# Patient Record
Sex: MALE | URBAN - METROPOLITAN AREA
[De-identification: names, ages, dates, MRNs, and addresses within clinical notes are randomized per-mention and may not be internally consistent; named-entity substitution may affect disease eponyms.]

---

## 2024-10-17 ENCOUNTER — TELEPHONE (OUTPATIENT)
Dept: NEUROLOGY | Facility: CLINIC | Age: 69
End: 2024-10-17
Payer: MEDICARE

## 2024-10-17 NOTE — TELEPHONE ENCOUNTER
Caller: Daquan Nagy    Relationship: Self    Best call back number: [phone]    What is the best time to reach you:     Who are you requesting to speak with (clinical staff, provider,  specific staff member):       Do you know the name of the person who called:     What was the call regarding:   PT IS REQUESTING APPT REMINDER THAT CONTAINS OFFICE ADDRESS BE MAILED TO HIS HOME ADDRESS:  [address] [place] [id number]

## 2024-12-13 ENCOUNTER — OFFICE VISIT (OUTPATIENT)
Dept: NEUROLOGY | Facility: CLINIC | Age: 69
End: 2024-12-13
Payer: MEDICARE

## 2024-12-13 VITALS
WEIGHT: 168 LBS | HEIGHT: 72 IN | DIASTOLIC BLOOD PRESSURE: 86 MMHG | OXYGEN SATURATION: 75 % | SYSTOLIC BLOOD PRESSURE: 142 MMHG | HEART RATE: 80 BPM | BODY MASS INDEX: 22.75 KG/M2

## 2024-12-13 DIAGNOSIS — G62.9 POLYNEUROPATHY: Primary | ICD-10-CM

## 2024-12-13 DIAGNOSIS — R25.2 JERKING MOVEMENTS OF EXTREMITIES: ICD-10-CM

## 2024-12-13 PROBLEM — R13.19 ESOPHAGEAL DYSPHAGIA: Status: ACTIVE | Noted: 2023-11-10

## 2024-12-13 PROBLEM — Z98.890 HISTORY OF CARDIAC CATHETERIZATION: Status: ACTIVE | Noted: 2024-12-13

## 2024-12-13 PROBLEM — Z85.038 PERSONAL HISTORY OF COLON CANCER: Status: ACTIVE | Noted: 2021-02-23

## 2024-12-13 PROBLEM — K22.0 ACHALASIA OF DIGESTIVE TRACT: Status: ACTIVE | Noted: 2023-11-10

## 2024-12-13 PROBLEM — I73.9 PERIPHERAL VASCULAR DISEASE: Status: ACTIVE | Noted: 2021-08-12

## 2024-12-13 PROBLEM — K22.2 BENIGN ESOPHAGEAL STRICTURE: Status: ACTIVE | Noted: 2024-01-02

## 2024-12-13 PROBLEM — E11.43 GASTROPARESIS DUE TO DM: Chronic | Status: ACTIVE | Noted: 2024-10-10

## 2024-12-13 PROBLEM — I10 BENIGN ESSENTIAL HYPERTENSION: Status: ACTIVE | Noted: 2021-02-23

## 2024-12-13 PROBLEM — K21.9 GASTROESOPHAGEAL REFLUX DISEASE WITHOUT ESOPHAGITIS: Status: ACTIVE | Noted: 2021-02-23

## 2024-12-13 PROBLEM — I11.9 HYPERTENSIVE HEART DISEASE: Status: ACTIVE | Noted: 2021-08-12

## 2024-12-13 PROBLEM — C18.9 MALIGNANT TUMOR OF COLON: Status: ACTIVE | Noted: 2024-12-13

## 2024-12-13 PROBLEM — I25.10 CORONARY ARTERIOSCLEROSIS: Status: ACTIVE | Noted: 2021-02-23

## 2024-12-13 PROBLEM — K59.00 CONSTIPATION: Status: ACTIVE | Noted: 2023-07-06

## 2024-12-13 PROBLEM — E11.65 HYPERGLYCEMIA DUE TO TYPE 2 DIABETES MELLITUS: Status: ACTIVE | Noted: 2021-02-23

## 2024-12-13 PROBLEM — K31.84 GASTROPARESIS DUE TO DM: Chronic | Status: ACTIVE | Noted: 2024-10-10

## 2024-12-13 PROBLEM — E55.9 VITAMIN D DEFICIENCY: Status: ACTIVE | Noted: 2024-12-13

## 2024-12-13 PROBLEM — E11.40 DIABETIC NEUROPATHY: Status: ACTIVE | Noted: 2021-02-23

## 2024-12-13 RX ORDER — LANCETS
EACH MISCELLANEOUS
COMMUNITY

## 2024-12-13 RX ORDER — FLUTICASONE PROPIONATE 50 MCG
SPRAY, SUSPENSION (ML) NASAL
COMMUNITY
End: 2024-12-13

## 2024-12-13 RX ORDER — GABAPENTIN 800 MG/1
800 TABLET ORAL 3 TIMES DAILY
COMMUNITY

## 2024-12-13 RX ORDER — GLIMEPIRIDE 2 MG/1
TABLET ORAL
COMMUNITY

## 2024-12-13 RX ORDER — ASPIRIN 81 MG/1
1 TABLET ORAL DAILY
COMMUNITY
End: 2024-12-13

## 2024-12-13 RX ORDER — DULOXETIN HYDROCHLORIDE 20 MG/1
20 CAPSULE, DELAYED RELEASE ORAL DAILY
Qty: 30 CAPSULE | Refills: 2 | Status: SHIPPED | OUTPATIENT
Start: 2024-12-13 | End: 2025-12-13

## 2024-12-13 RX ORDER — DAPAGLIFLOZIN 10 MG/1
TABLET, FILM COATED ORAL
COMMUNITY

## 2024-12-13 RX ORDER — ERGOCALCIFEROL 1.25 MG/1
CAPSULE, LIQUID FILLED ORAL
COMMUNITY
Start: 2024-09-19

## 2024-12-13 RX ORDER — SUCRALFATE ORAL 1 G/10ML
SUSPENSION ORAL
COMMUNITY
Start: 2024-10-11 | End: 2024-12-13

## 2024-12-13 RX ORDER — BLOOD-GLUCOSE METER
EACH MISCELLANEOUS
COMMUNITY

## 2024-12-13 RX ORDER — ROPINIROLE 0.5 MG/1
TABLET, FILM COATED ORAL
COMMUNITY
End: 2024-12-13

## 2024-12-13 RX ORDER — FAMOTIDINE 20 MG/1
TABLET, FILM COATED ORAL
COMMUNITY
End: 2024-12-13

## 2024-12-13 RX ORDER — LISINOPRIL 10 MG/1
TABLET ORAL
COMMUNITY

## 2024-12-13 RX ORDER — PANTOPRAZOLE SODIUM 40 MG/1
40 TABLET, DELAYED RELEASE ORAL
COMMUNITY
Start: 2024-09-11

## 2024-12-13 RX ORDER — SITAGLIPTIN 100 MG/1
TABLET, FILM COATED ORAL
COMMUNITY

## 2024-12-13 RX ORDER — METOCLOPRAMIDE 5 MG/1
5 TABLET ORAL 3 TIMES DAILY
COMMUNITY
Start: 2024-09-11 | End: 2024-12-13

## 2024-12-13 RX ORDER — ATORVASTATIN CALCIUM 20 MG/1
TABLET, FILM COATED ORAL
COMMUNITY

## 2024-12-13 RX ORDER — TRAMADOL HYDROCHLORIDE 50 MG/1
TABLET ORAL
COMMUNITY

## 2024-12-13 RX ORDER — LINACLOTIDE 290 UG/1
CAPSULE, GELATIN COATED ORAL
COMMUNITY

## 2024-12-13 RX ORDER — CYCLOBENZAPRINE HCL 10 MG
TABLET ORAL
COMMUNITY

## 2024-12-13 RX ORDER — METOPROLOL SUCCINATE 25 MG/1
TABLET, EXTENDED RELEASE ORAL
COMMUNITY

## 2024-12-13 RX ORDER — CLOPIDOGREL BISULFATE 75 MG/1
TABLET ORAL
COMMUNITY

## 2024-12-13 RX ORDER — NITROGLYCERIN 0.4 MG/1
TABLET SUBLINGUAL
COMMUNITY

## 2024-12-13 NOTE — PROGRESS NOTES
Neuro Office Visit      Encounter Date: 2024   Patient Name: Daquan Nagy  : 1955   MRN: [id number]   PCP:  Rosario Patel APRN     Chief Complaint:    Chief Complaint   Patient presents with   • myoclonus       History of Present Illness: Daquan Nagy is a 69 y.o. male who is here today in Neurology for  myoclonus    PMH of achalasia of digestive tract, benign esophageal stricture, benign essential hypertension, constipation, coronary arteriosclerosis, diabetes mellitus, diabetic neuropathy, esophageal dysphagia, gastroesophageal reflux disease without esophagitis, hyperglycemia due to type 2 diabetes, hypertensive heart disease, left knee pain, ligament tumor of colon, mixed hyperlipidemia, neuropathy, peripheral vascular disease, personal history of colon cancer, thoracic and lumbosacral neuritis, vitamin D deficiency    Primary care appointment on 2024 he reported uncontrollable jerking of his extremities PCP did prescribe ropinirole.    In clinic today Daquan reports that his symptoms started 2 years ago of jerking, involuntary whole body jerking, at that time he was taking Reglan frequently. He notes that the frequency improved in the last 5 months, he stopped Reglan about 5 months ago. He was taking Reglan for constipation. He tried Requip but this was stopped by GI doctor in May of this year. Spouse notes jerking prior to falling asleep nightly. Daytime jerking has improved however he did have some yesterday.  He has been on Gabapentin 800 mg TID for several years. He also takes Tramadol 50 mg BID and Cyclobenzaprine 10 mg TID for leg pain. He reports that he can go up to a week without any jerking or it can happen daily but overall frequency is much improved since stopped Reglan.    He is also concerned about neuropathy, was noted on EMG 2014 showed sensorimotor neuropathy, mild.  No evidence for radiculopathy or plexopathy is seen on the left leg.  started has been  more notable over the last few years. He reports that his legs hurt, worse at night, will sometimes feel like a bee stinging his legs. He notes numbness/pain in bilateral hands. He reports that most recent A1c was 7.7. Numbness has progressed in his legs up to his knees, worst in his feet. He reports that his feet feel cold. He takes Gabapentin 800 mg TID with mild relief of his symptoms. No sleepiness from Gabapentin. Spouse describes concern for circulation, not currently seeing vascular, feet are dark when he goes to bed and white in the morning. He has had a few falls. He reports that his balance is poor at times. No significant neck or back pain.     Subjective      Review of Systems   Constitutional: Negative.    HENT: Negative.     Eyes: Negative.    Respiratory:  Negative for shortness of breath.    Cardiovascular: Negative.    Musculoskeletal:  Negative for back pain and neck pain.   Skin:  Positive for color change.   Neurological:  Positive for numbness.        Involuntary muscle jerking   Psychiatric/Behavioral:  Positive for sleep disturbance.           Past Medical History: History reviewed. No pertinent past medical history.    Past Surgical History: History reviewed. No pertinent surgical history.    Family History: History reviewed. No pertinent family history.    Social History:   Social History     Socioeconomic History   • Marital status:    Tobacco Use   • Smoking status: Former     Types: Cigarettes     Passive exposure: Past   Vaping Use   • Vaping status: Never Used       Medications:     Current Outpatient Medications:   •  Accu-Chek Softclix Lancets lancets, USE AS DIRECTED ONCE EVERY DAY, Disp: , Rfl:   •  atorvastatin (LIPITOR) 20 MG tablet, TAKE ONE (1) TABLET EVERY DAY BY ORAL ROUTE FOR 90 DAYS., Disp: , Rfl:   •  Blood Glucose Monitoring Suppl (Accu-Chek Guide Me) w/Device kit, USE AS DIRECTED PER DOCTORS INSTRUCTION, Disp: , Rfl:   •  clopidogrel (PLAVIX) 75 MG tablet, take one  (1) tablet by mouth every day, Disp: , Rfl:   •  Cyanocobalamin 1000 MCG/ML kit, Inject 1 mL every month by subcutaneous route., Disp: , Rfl:   •  cyclobenzaprine (FLEXERIL) 10 MG tablet, TAKE ONE (1) TABLET THREE (3) TIMES A DAY BY ORAL ROUTE AS NEEDED FOR 30 DAYS., Disp: , Rfl:   •  Farxiga 10 MG tablet, TAKE ONE (1) TABLET BY MOUTH ONCE DAILY IN THE MORNING, Disp: , Rfl:   •  gabapentin (NEURONTIN) 800 MG tablet, Take 1 tablet by mouth 3 (Three) Times a Day., Disp: , Rfl:   •  glimepiride (AMARYL) 2 MG tablet, TAKE ONE (1) TABLET BY MOUTH EVERY DAY AT DINNER, Disp: , Rfl:   •  glucose blood test strip, USE ONE (1) STRIP DAILY TO TEST BLOOD SUGAR, Disp: , Rfl:   •  Januvia 100 MG tablet, TAKE ONE (1) TABLET EVERY DAY BY ORAL ROUTE AS DIRECTED FOR 90 DAYS., Disp: , Rfl:   •  Linzess 290 MCG capsule capsule, TAKE ONE (1) CAPSULE EVERY DAY BY ORAL ROUTE FOR 90 DAYS., Disp: , Rfl:   •  lisinopril (PRINIVIL,ZESTRIL) 10 MG tablet, take one (1) tablet by mouth every day, Disp: , Rfl:   •  metFORMIN (GLUCOPHAGE) 1000 MG tablet, Take 1 tablet by mouth 2 (Two) Times a Day., Disp: , Rfl:   •  metoprolol succinate XL (TOPROL-XL) 25 MG 24 hr tablet, take one (1) tablet by mouth every day, Disp: , Rfl:   •  nitroglycerin (NITROSTAT) 0.4 MG SL tablet, DISSOLVE ONE (1) TABLET UNDER TONGUE AS NEEDED, Disp: , Rfl:   •  NON FORMULARY, BD ULTRA-FINE HUNTER PEN NEEDLE, Disp: , Rfl:   •  pantoprazole (PROTONIX) 40 MG EC tablet, Take 1 tablet by mouth., Disp: , Rfl:   •  traMADol (ULTRAM) 50 MG tablet, TAKE ONE (1) TABLET BY MOUTH TWICE DAILY AS NEEDED, Disp: , Rfl:   •  vitamin D (ERGOCALCIFEROL) 1.25 MG (46871 UT) capsule capsule, TAKE ONE (1) CAPSULE BY MOUTH EVERY WEEK, Disp: , Rfl:   •  DULoxetine (Cymbalta) 20 MG capsule, Take 1 capsule by mouth Daily., Disp: 30 capsule, Rfl: 2    Allergies:   Allergies   Allergen Reactions   • Rosuvastatin Myalgia   • Bempedoic Acid GI Intolerance   • Ezetimibe Nausea Only   • Liraglutide Unknown -  "High Severity   • Capsaicin Rash         STEADI Fall Risk Assessment has not been completed.    Objective     Objective:    /86   Pulse 80   Ht 182.9 cm (72\")   Wt 76.2 kg (168 lb)   SpO2 (!) 75%   BMI 22.78 kg/m²   Body mass index is 22.78 kg/m². Patient reports that his oxygen typically doesn't  well, however their home monitor he is usually 95%, he denied any shortness of breath, he has been advised to recheck at home and if it is low and/or they have any clinical concern such as shortness of breath he is to seek emergent care    Physical Exam  Vitals reviewed.   Constitutional:       Appearance: Normal appearance.   HENT:      Head: Normocephalic and atraumatic.      Mouth/Throat:      Mouth: Mucous membranes are moist.      Pharynx: Oropharynx is clear.   Pulmonary:      Effort: Pulmonary effort is normal. No respiratory distress.   Skin:     Comments: Feet are cooler to touch than rest of legs bilaterally   Neurological:      Mental Status: He is alert.          Neurology Exam:    General apperance: NAD.     Mental status: Alert, awake and oriented to time place and person.    Fund of knowledge:  Normal.     Language and Speech: No aphasia or dysarthria.    Naming , Repetition and Comprehension:  Can name objects, repeat a sentence and follow commands. Speech is clear and fluent with good repetition, comprehension, and naming.    Cranial Nerves:   CN II: Visual fields are full. Intact. Pupils - PERRLA  CN III, IV and VI: Extraocular movements are intact. Normal saccades.   CN V: Facial sensation is intact.   CN VII: Muscles of facial expression reveal no asymmetry. Intact.   CN VIII: Hearing is intact.   CN IX and X: Palate elevates symmetrically. Intact  CN XI: Shoulder shrug is intact.   CN XII: Tongue is midline without evidence of atrophy or fasciculation.     Motor:  Right UE muscle strength 5/5. Normal tone.     Left UE muscle strength 5/5. Normal tone.      Right LE muscle strength " 5/5. Normal tone.     Left LE muscle strength 5/5. Normal tone.      Sensory: Decreased sensation to light touch in bilateral feet extending into calves below his knees, decreased sensation in his fingertips, decreased vibratory sense in BLE    DTRs: 1+ bilaterally in upper and lower extremities.    Coordination: Normal finger-to-nose, heel to jean baptiste B/L.    Romberg: Mild swaying.    Gait: Overall steady, no assistive device.          Results:   Imaging:   EK EKG 12 LEAD    Result Date: 2024                              [facility]                                                                               Test Date:    2024 Pat Name:     RAHUL RICHARD           Department:   [facility] Patient ID:   [id number]                 Room:         Gender:       Male                     Technician:   [facility] :          1955               Requested By: TATE MAHMOOD Order Number: [id number]                Reading MD:   Petar eFlder MD                                  Measurements Intervals                              Axis           Rate:         83                       P:            54 NY:           137                      QRS:          29 QRSD:         82                       T:            36 QT:           360                                     QTc:          424                                                                Interpretive Statements SINUS RHYTHM WARNING: DATA QUALITY MAY AFFECT INTERPRETATION Electronically Signed On 2024 12:58:33 EDT by Petar Felder MD    FL BARIUM ENEMA SINGLE-CONTRAST    Result Date: 2024  Findings compatible with achalasia and concordant with provided clinical history. Correlate with prior workup. Patchy right upper lung and left midlung airspace disease. - Note: Radiology results need to be interpreted within a comprehensive clinical context.  If you have questions about the radiology report, please contact the office of the ordering  clinician.       Labs:   BMP from from 3/25/2024 was within normal limits with exception of glucose elevated at 151 and calcium elevated at 10.3    Assessment / Plan      Assessment/Plan:   Diagnoses and all orders for this visit:    1. Polyneuropathy (Primary)  -     DULoxetine (Cymbalta) 20 MG capsule; Take 1 capsule by mouth Daily.  Dispense: 30 capsule; Refill: 2    2. Jerking movements of extremities       Daquan Nagy is in neurology clinic to establish care for myoclonus and polyneuropathy. Given his description of timeline of onset of jerking movements of his extremities I am highly suspicious that this was a adverse effect from long-term use of Reglan, he reports that he stopped this approximately 5 months ago and that the episodes have decreased significantly in that time, will also start magnesium glycinate 120 mg daily.  Regarding his neuropathy, he has previously been diagnosed with diabetic neuropathy, he reports that his most recent A1c was 7.7, we discussed that in order to slow the progression of neuropathy goal A1c is less than 7.  He is currently taking gabapentin 800 mg 3 times daily per PCP and denies grogginess from this medication, in discussion with Dr. Suero it is possible that gabapentin may be causing myoclonus however it does appear to have been more closely correlated with Reglan.  Instead of increasing gabapentin we elected to add low-dose Cymbalta 20 mg daily to aid with neuropathic discomfort, patient is to contact clinic with his response as dose can be adjusted.  I also have concerns regarding peripheral circulation for which we discussed consideration to vascular surgery or cardiology, patient reports that he is already established with a cardiologist and will follow-up locally at home, I have advised to please let me know if he should need additional referral as this order can be placed to ensure that there is not circulation issues contributing to his neuropathic pain, of note  he does have a prior diagnosis of peripheral vascular disease.  Oxygen level was picking up at 75%, patient reports that this often happens in clinics and with his home oxygen reading is typically 95%, he denies any shortness of breath, I have advised that this be rechecked upon return home and if level remains low or he has any concern for shortness of breath or any other clinical concern that they need to seek emergent care, patient and spouse reported understanding.  Physical therapy referral also offered for gait and balance training which was deferred at this time by patient.  Will plan to see Daquan back in clinic in 8 weeks or sooner for any questions or concerns.    Patient Education:       Reviewed medications, potential side effects and signs and symptoms to report. Discussed risk versus benefits of treatment plan with patient and/or family-including medications, labs and radiology that may be ordered. Addressed questions and concerns during visit. Patient and/or family verbalized understanding and agree with plan. Instructed to call the office with any questions and report to ER with any life-threatening symptoms.     Follow Up:   Return in about 8 weeks (around 2/7/2025).    I spent 45 minutes in the care of this patient. I personally spent 50 percent of this time counseling and discussing diagnosis, diagnostic testing, evaluation, treatment options, and management .       During this visit the following were done:  Labs Reviewed [x]    Labs Ordered []    Radiology Reports Reviewed []    Radiology Ordered []    PCP Records Reviewed [x]    Referring Provider Records Reviewed []    ER Records Reviewed []    Hospital Records Reviewed []    History Obtained From Family []    Radiology Images Reviewed []    Other Reviewed [x]    Records Requested []      S[facility] NEUR[facility]  [address] [facility] [address]  [facility]  [address]  [phone]

## 2024-12-16 ENCOUNTER — TELEPHONE (OUTPATIENT)
Dept: NEUROLOGY | Facility: CLINIC | Age: 69
End: 2024-12-16
Payer: MEDICARE

## 2024-12-16 ENCOUNTER — TELEPHONE (OUTPATIENT)
Dept: NEUROLOGY | Facility: CLINIC | Age: 69
End: 2024-12-16

## 2024-12-16 DIAGNOSIS — I73.9 PERIPHERAL VASCULAR DISEASE: Primary | ICD-10-CM

## 2024-12-16 NOTE — TELEPHONE ENCOUNTER
Provider: PATRICIA ROYAL    Caller: SYDNEY RICHARD)    Relationship to Patient: Emergency Contact    Pharmacy: [facility]    Phone Number: [phone]    Reason for Call: STATES SHE SPOKE WITH [facility] IN [place] & THEY ADVISED THE DID NOT GET SCRIPT FOR CYMBALTA. PLEASE REVIEW & ADVISE, THANK YOU.

## 2024-12-16 NOTE — TELEPHONE ENCOUNTER
[facility]    Best call back number: [phone]     What was the call regarding: AIDA CALLED REGARDING A REFERRAL THAT WAS RECEIVED. THEY NEED TO GET CLARIFICATION AS TO WHERE IT NEEDS TO BE FORWARDED. THEY WERE UNSURE IF IT WAS TO BE SENT TO DR BERMEO OR SOMEWHERE ELSE.     PLEASE ADVISE  THANK YOU

## 2024-12-16 NOTE — TELEPHONE ENCOUNTER
Spoke with pharmacy and they stated they had just recently got their system back up and running and have been working on getting caught up.  They will have his script ready here shortly.    Spoke with An and let her know and she stated she had just got off phone with them as well.  She also asked about the magnesium gluconate as she is not able to find the 120mg only the 240mg capsule and is unable to split the capsule.  After speaking with Lisa, I let her know they were available online at places like [employer].  She stated she will let her daughter order it for her as she does not know how.  I told her Lisa advises starting with the 120mg as the side effects with the 240mg would be double-such as diarrhea.  She voiced understanding and will not start him on anything until she gets the 120mg.

## 2024-12-16 NOTE — TELEPHONE ENCOUNTER
Caller: GENO    Relationship to Patient: WIFE     Phone Number:     [phone] (Mobile)           Reason for Call: SHE STATES SHE WAS ADVISED TO KEEP AN EYE ON HIS OXYGEN LEVEL AND HEART RATE. SHE STATES OXYGEN IS 94-95 AND HEART RATE HAS BEEN AROUND 87.    SHE IS WONDERING IF A REFERRAL CAN BE SENT TO [facility]   Located in: [facility]  Address: [address] [place] [address] [address], [place] [address]  Phone: [phone]

## 2024-12-18 ENCOUNTER — TELEPHONE (OUTPATIENT)
Dept: NEUROLOGY | Facility: CLINIC | Age: 69
End: 2024-12-18
Payer: MEDICARE

## 2024-12-18 NOTE — TELEPHONE ENCOUNTER
Spoke with An and explained that I was not sure of who called her as Lisa stated she did not and there is no encounter in chart as to who did and why.  I apologized and she was in good understanding.

## 2024-12-18 NOTE — TELEPHONE ENCOUNTER
Caller: SYDNEY RICHARD (GENO)    Relationship: Emergency Contact    Best call back number: [phone]     What is the best time to reach you: ANYTIME    What was the call regarding: THEY RECEIVED A CALL @ 5:04 LAST NIGHT BUT THEY WERE UNABLE TO GET CONNECTED. THEY ARE CALLING BACK TO SEE WHAT IS GOING ON.     PLEASE ADVISE  THANK YOU

## 2024-12-30 ENCOUNTER — TELEPHONE (OUTPATIENT)
Dept: NEUROLOGY | Facility: CLINIC | Age: 69
End: 2024-12-30

## 2024-12-30 NOTE — TELEPHONE ENCOUNTER
Caller: GENO      Relationship: SPOUSE      Best call back number: [phone]      What was the call regarding: PT NEEDS REF SENT TO [facility]  [address]   PHONE #[phone]     DR RAJINDER BERMEO      Is it okay if the provider responds through MyChart: CALL PT SAID THEY NEVER RECEIVE REFERRAL .      PLEASE ADVISE

## 2025-01-22 ENCOUNTER — TELEPHONE (OUTPATIENT)
Dept: NEUROLOGY | Facility: CLINIC | Age: 70
End: 2025-01-22
Payer: MEDICARE

## 2025-01-22 NOTE — TELEPHONE ENCOUNTER
Caller: SYDNEY RICHARD (GENO)    Relationship: Emergency Contact; SPOUSE    Best call back number: [phone]    What was the call regarding: PT'S WIFE STATES DR. RAJINDER BERMEO, PT'S CARDIOLOGIST, HAS ORDERED 3 TESTS FOR PT TO HAVE COMPLETED. SHE CANNOT RECALL THE NAME OF ONE OF THE TESTS, BUT KNOWS HE HAS ORDERED A HEART ULTRASOUND AND STRESS TEST- THESE HAVE NOT YET BEEN SCHEDULED AS THEY ARE PENDING INSURANCE AUTHORIZATION. PT THEN WILL COMPLETE A F/U VISIT W/ DR. BERMEO ON 2/21/25 TO GO OVER THE TEST RESULTS.    PT'S F/U VISIT W/ SUNDAY BRANDT IS SCHEDULED FOR 2/7/25. PT'S WIFE WOULD LIKE TO KNOW IF PATRICIA WOULD WANT TO POSTPONE PT'S F/U APPT UNTIL HIS TESTING W/ CARDIOLOGY HAS BEEN COMPLETED?    Do you require a callback: YES, PLEASE.    PLEASE REVIEW AND ADVISE.

## 2025-01-23 NOTE — TELEPHONE ENCOUNTER
Spoke with An and have rescheduled Daquan for 3/28/25 @3:00.  She said cardiologist is not going to do the dye until he receives results of other tests first and is unsure of that date.  I told her that if his tests are not done by this test to call us and we will r/s again.  She is in good understanding.

## 2025-03-28 ENCOUNTER — OFFICE VISIT (OUTPATIENT)
Dept: NEUROLOGY | Facility: CLINIC | Age: 70
End: 2025-03-28
Payer: MEDICARE

## 2025-03-28 VITALS
BODY MASS INDEX: 22.75 KG/M2 | SYSTOLIC BLOOD PRESSURE: 110 MMHG | OXYGEN SATURATION: 99 % | WEIGHT: 168 LBS | HEIGHT: 72 IN | HEART RATE: 87 BPM | DIASTOLIC BLOOD PRESSURE: 66 MMHG

## 2025-03-28 DIAGNOSIS — G62.9 POLYNEUROPATHY: ICD-10-CM

## 2025-03-28 DIAGNOSIS — R25.2 JERKING MOVEMENTS OF EXTREMITIES: Primary | ICD-10-CM

## 2025-03-28 PROBLEM — R94.31 ELECTROCARDIOGRAM ABNORMAL: Status: ACTIVE | Noted: 2025-01-26

## 2025-03-28 PROBLEM — I73.9 INTERMITTENT CLAUDICATION: Status: ACTIVE | Noted: 2025-01-22

## 2025-03-28 PROBLEM — R94.39 CARDIOVASCULAR STRESS TEST ABNORMAL: Status: ACTIVE | Noted: 2025-02-13

## 2025-03-28 RX ORDER — TICAGRELOR 90 MG/1
TABLET ORAL
COMMUNITY
Start: 2025-03-19

## 2025-03-28 RX ORDER — RIVAROXABAN 2.5 MG/1
TABLET, FILM COATED ORAL
COMMUNITY
Start: 2025-03-21

## 2025-03-28 RX ORDER — ISOSORBIDE MONONITRATE 30 MG/1
TABLET, EXTENDED RELEASE ORAL
COMMUNITY
Start: 2025-03-04 | End: 2025-03-28

## 2025-03-28 RX ORDER — SUCRALFATE 1 G/1
1 TABLET ORAL EVERY 6 HOURS
COMMUNITY
Start: 2025-03-20

## 2025-03-28 NOTE — Clinical Note
Can you please request records from [facility] regarding hospitalization for heart attack? Thank you!

## 2025-03-31 ENCOUNTER — TELEPHONE (OUTPATIENT)
Dept: NEUROLOGY | Facility: CLINIC | Age: 70
End: 2025-03-31
Payer: MEDICARE

## 2025-03-31 NOTE — TELEPHONE ENCOUNTER
"Left detailed VM.    Relay:\"Lisa had received the message that you had been started again on gabapentin.  Did you notice any decrease in the muscle jerking? How many days did you take Gabapentin 800 mg BID instead of TID? What dosage are you currently taking now?\"  "

## 2025-03-31 NOTE — TELEPHONE ENCOUNTER
CALLED PATIENT TO FIND OUT WHICH HOSPITAL IN [facility] THAT PATIENT WENT TO WHEN HAD HEART ATTACK. SPOKE TO SPOUSE AND SHE TOLD ME THAT HE WENT TO [facility] AND ALSO SHE WANTED ME TO RELAY MESSAGE TO PATRICIA THAT HE IS TAKING HIS GABAPENTIN AGAIN BECAUSE HE NEEDED IT.

## 2025-04-08 ENCOUNTER — TELEPHONE (OUTPATIENT)
Dept: NEUROLOGY | Facility: CLINIC | Age: 70
End: 2025-04-08
Payer: MEDICARE

## 2025-04-08 DIAGNOSIS — G62.9 POLYNEUROPATHY: Primary | ICD-10-CM

## 2025-04-08 NOTE — TELEPHONE ENCOUNTER
Called and talked with An, relayed kelly's message that her recommendation is a pain specialist and not to increase the Gabapentin due to side effects. Wife was agreeable to this but would like to see if we can find someone closer to their location. I discussed with her online it looks like [place] has options at the hospital and that [place], [place] has options too. She said that would be better. I found maybe options at [facility].

## 2025-04-08 NOTE — TELEPHONE ENCOUNTER
I have placed pain management orders with preference of close to home and not in [place]. We could also consider repeating EMG as prior EMG was in 2014 to further assess his neuropathy. I know this is not a comfortable test to have done but it could be helpful clinically, would that be something that he would be willing to have done given the progression of his symptoms over time?

## 2025-04-08 NOTE — TELEPHONE ENCOUNTER
Caller: SYDNEY RICHARD (GENO)    Relationship: Emergency Contact    Best call back number: Mobile phone: [phone]      Which medication are you concerned about: GABAPENTIN    Who prescribed you this medication: SUNDYA BRANDT    What are your concerns: PT'S WIFE STATES IT WAS DECIDED AT PT'S LAST APPT, 3/28/25, TO DECREASE PT'S GABAPENTIN 800MG RX FROM TID TO BID. PT'S WIFE STATES THAT SINCE DECREASING FROM TID TO BID, PT HAS BEEN EXPERIENCING WORSENING NEUROPATHY SYMPTOMS IN HER LEGS (FROM BOTH KNEES DOWN TO HIS FEET). PT'S WIFE STATES PT HAS HAD NO CHANGE IN THE JERKING/INVOLUNTARY MOVEMENTS.    PT'S WIFE STATES THAT A FEW DAYS AGO, PT BEGAN TAKING THREE TABLETS DAILY AGAIN. PT WOULD LIKE TO KNOW WHAT SUNDAY BR[facility] WOULD RECOMMEND MOVING FORWARD.    PT'S WIFE NOTES PT WAS UPSET THAT SUNDAY BRANDT DID NOT LOOK AT HIS FEET AT HIS MOST RECENT APPT.    PLEASE REVIEW AND ADVISE.

## 2025-04-09 NOTE — TELEPHONE ENCOUNTER
Left a message, have called the hospital, no one in Medical Records is picking up. Called to see if she knows the doctor who ordered it.

## 2025-04-09 NOTE — TELEPHONE ENCOUNTER
An informed me that he had an EMG done at [place] in February, the 20th. She said that he had done through [facility] within the hospital. I will try and get those results.

## 2025-04-09 NOTE — TELEPHONE ENCOUNTER
Called Dr. Lowe's office, they confirmed that they didn't order one for the patient. Will try the hospital again to see if the test had been ordered through them at all.

## 2025-04-09 NOTE — TELEPHONE ENCOUNTER
Left message with medical records, trying to find out who ordered the test and if it was even done there.

## 2025-04-10 ENCOUNTER — TELEPHONE (OUTPATIENT)
Dept: NEUROLOGY | Facility: CLINIC | Age: 70
End: 2025-04-10
Payer: MEDICARE

## 2025-04-10 DIAGNOSIS — G62.9 POLYNEUROPATHY: Primary | ICD-10-CM

## 2025-04-10 DIAGNOSIS — R20.2 NUMBNESS AND TINGLING: ICD-10-CM

## 2025-04-10 DIAGNOSIS — R20.0 NUMBNESS AND TINGLING: ICD-10-CM

## 2025-04-10 NOTE — TELEPHONE ENCOUNTER
An said that they would do the EMG. She is okay with driving to [facility] for it but is concerned about getting the address. Have told her we can mail out the address once appointment is made. She was fine with that.

## 2025-04-10 NOTE — TELEPHONE ENCOUNTER
Caller: SYDNEY RICHARD (GENO)    Relationship: Emergency Contact    Best call back number: [phone]    What is the best time to reach you: ANYTIME    Who are you requesting to speak with (clinical staff, provider,  specific staff member): CLINICAL      What was the call regarding: PATIENT'S WIFE CALLED BACK AND CLARIFIED THAT THE TESTING DONE WITH DR RAJINDER PARRY IN FEBRUARY 2025 WAS CARDIAC TESTING ONLY AND DID NOT INCLUDE ANY EMG TESTING. PATIENT'S WIFE NOTES PATIENT HAS NEVER HAD AN EMG COMPLETED. PATIENT SAW A PODIATRIST IN 2014 IN WHICH HE RAN NEEDLE DOWN THE BOTTOM OF PATIENT'S FOOT TO DETERMINE IF PATIENT COULD FEEL IT.    Is it okay if the provider responds through [employer]: NO    PLEASE REVIEW AND ADVISE